# Patient Record
Sex: MALE | Race: WHITE | Employment: FULL TIME | URBAN - METROPOLITAN AREA
[De-identification: names, ages, dates, MRNs, and addresses within clinical notes are randomized per-mention and may not be internally consistent; named-entity substitution may affect disease eponyms.]

---

## 2023-11-04 ENCOUNTER — HOSPITAL ENCOUNTER (OUTPATIENT)
Age: 38
Discharge: HOME | End: 2023-11-04
Payer: COMMERCIAL

## 2023-11-04 VITALS
TEMPERATURE: 98 F | DIASTOLIC BLOOD PRESSURE: 89 MMHG | BODY MASS INDEX: 29.12 KG/M2 | HEART RATE: 87 BPM | SYSTOLIC BLOOD PRESSURE: 128 MMHG | WEIGHT: 215 LBS | HEIGHT: 72 IN | OXYGEN SATURATION: 98 % | RESPIRATION RATE: 16 BRPM

## 2023-11-04 DIAGNOSIS — M25.569 ACUTE KNEE PAIN, UNSPECIFIED LATERALITY: Primary | ICD-10-CM

## 2023-11-04 PROCEDURE — 99202 OFFICE O/P NEW SF 15 MIN: CPT

## 2023-11-04 PROCEDURE — 99203 OFFICE O/P NEW LOW 30 MIN: CPT

## 2023-11-04 NOTE — DISCHARGE INSTRUCTIONS
Ace wrap applied. Use with ambulation. Remove Ace wrap at bedtime. Rest, ice application, ice for 30 minutes on than 30 minutes off every 6 hours. Elevate extremity. NSAIDs/Tylenol for pain  Avoid kneeling, squatting, running, prolonged standing and walking for few days. Increase activity as tolerated  Follow up with pcp in 1 week if not improving for  XRs to rule out any occult fractures or MRI to rule out any ligamentous or meniscal injuries.